# Patient Record
Sex: FEMALE | Race: ASIAN | NOT HISPANIC OR LATINO
[De-identification: names, ages, dates, MRNs, and addresses within clinical notes are randomized per-mention and may not be internally consistent; named-entity substitution may affect disease eponyms.]

---

## 2023-11-28 ENCOUNTER — APPOINTMENT (OUTPATIENT)
Dept: SURGICAL ONCOLOGY | Facility: CLINIC | Age: 73
End: 2023-11-28
Payer: MEDICARE

## 2023-11-28 VITALS
HEIGHT: 61.81 IN | WEIGHT: 135 LBS | DIASTOLIC BLOOD PRESSURE: 72 MMHG | SYSTOLIC BLOOD PRESSURE: 114 MMHG | HEART RATE: 80 BPM | TEMPERATURE: 97.7 F | RESPIRATION RATE: 17 BRPM | OXYGEN SATURATION: 98 % | BODY MASS INDEX: 24.84 KG/M2

## 2023-11-28 PROBLEM — Z00.00 ENCOUNTER FOR PREVENTIVE HEALTH EXAMINATION: Status: ACTIVE | Noted: 2023-11-28

## 2023-11-28 PROCEDURE — 99204 OFFICE O/P NEW MOD 45 MIN: CPT

## 2024-02-27 ENCOUNTER — APPOINTMENT (OUTPATIENT)
Dept: SURGICAL ONCOLOGY | Facility: CLINIC | Age: 74
End: 2024-02-27
Payer: MEDICARE

## 2024-02-27 VITALS
HEART RATE: 72 BPM | TEMPERATURE: 97.2 F | OXYGEN SATURATION: 98 % | DIASTOLIC BLOOD PRESSURE: 78 MMHG | HEIGHT: 61 IN | SYSTOLIC BLOOD PRESSURE: 121 MMHG | WEIGHT: 135 LBS | BODY MASS INDEX: 25.49 KG/M2 | RESPIRATION RATE: 17 BRPM

## 2024-02-27 PROCEDURE — 99213 OFFICE O/P EST LOW 20 MIN: CPT

## 2024-03-05 NOTE — HISTORY OF PRESENT ILLNESS
[de-identified] : Ms. Mosqueda is a 72 y/o female who presented with a bleeding distal duodenal GIST and severe anemia. She is s/p open resection at [facility] 11/18/2022. Pathology shows a 5.8 x 5.0 x 5.0 cm low grade GIST with negative margins. She had been on adjuvant Gleevec. Recent surveillance PET/CT scan 11/05/2023 demonstrated a NEW PET avid lower midline abdominal wall soft tissue nodule abutting the rectus muscle, measuring 1.5 cm with SUV max 3.8.  Findings were concerning for metastasis. She is referred for further evaluation. Patient feels well and is tolerating diet. She denies abdominal pain.  02/27/2024: Patient feels well and denies abdominal pain.  Since her initial consultation, she has undergone IR biopsy of PET avid abdominal wall mass 12/28/2023 but results were nondiagnostic (showed fibroadipose tissue).  She is referred for surgical excision.

## 2024-03-05 NOTE — REASON FOR VISIT
[Follow-Up Visit] : a follow-up visit for [Spouse] : spouse [Other: _____] : [unfilled] [FreeTextEntry2] : abdominal wall mass

## 2024-03-05 NOTE — PHYSICAL EXAM
[FreeTextEntry1] : Abdomen: soft, nontender/nondistended.  Well healed midline incision.  Soft tissue nodule not palpable.

## 2024-03-08 ENCOUNTER — OUTPATIENT (OUTPATIENT)
Dept: OUTPATIENT SERVICES | Facility: HOSPITAL | Age: 74
LOS: 1 days | End: 2024-03-08
Payer: MEDICARE

## 2024-03-08 VITALS
HEIGHT: 62 IN | TEMPERATURE: 100 F | OXYGEN SATURATION: 100 % | RESPIRATION RATE: 16 BRPM | SYSTOLIC BLOOD PRESSURE: 126 MMHG | WEIGHT: 134.04 LBS | DIASTOLIC BLOOD PRESSURE: 82 MMHG | HEART RATE: 64 BPM

## 2024-03-08 DIAGNOSIS — R19.00 INTRA-ABDOMINAL AND PELVIC SWELLING, MASS AND LUMP, UNSPECIFIED SITE: ICD-10-CM

## 2024-03-08 DIAGNOSIS — Z90.49 ACQUIRED ABSENCE OF OTHER SPECIFIED PARTS OF DIGESTIVE TRACT: Chronic | ICD-10-CM

## 2024-03-08 DIAGNOSIS — R22.2 LOCALIZED SWELLING, MASS AND LUMP, TRUNK: ICD-10-CM

## 2024-03-08 LAB
ALBUMIN SERPL ELPH-MCNC: 4.1 G/DL — SIGNIFICANT CHANGE UP (ref 3.3–5)
ALP SERPL-CCNC: 56 U/L — SIGNIFICANT CHANGE UP (ref 40–120)
ALT FLD-CCNC: 16 U/L — SIGNIFICANT CHANGE UP (ref 4–33)
ANION GAP SERPL CALC-SCNC: 10 MMOL/L — SIGNIFICANT CHANGE UP (ref 7–14)
AST SERPL-CCNC: 22 U/L — SIGNIFICANT CHANGE UP (ref 4–32)
BILIRUB SERPL-MCNC: 0.3 MG/DL — SIGNIFICANT CHANGE UP (ref 0.2–1.2)
BLD GP AB SCN SERPL QL: NEGATIVE — SIGNIFICANT CHANGE UP
BUN SERPL-MCNC: 13 MG/DL — SIGNIFICANT CHANGE UP (ref 7–23)
CALCIUM SERPL-MCNC: 8.7 MG/DL — SIGNIFICANT CHANGE UP (ref 8.4–10.5)
CHLORIDE SERPL-SCNC: 111 MMOL/L — HIGH (ref 98–107)
CO2 SERPL-SCNC: 23 MMOL/L — SIGNIFICANT CHANGE UP (ref 22–31)
CREAT SERPL-MCNC: 0.75 MG/DL — SIGNIFICANT CHANGE UP (ref 0.5–1.3)
EGFR: 85 ML/MIN/1.73M2 — SIGNIFICANT CHANGE UP
GLUCOSE SERPL-MCNC: 94 MG/DL — SIGNIFICANT CHANGE UP (ref 70–99)
HCT VFR BLD CALC: 32.9 % — LOW (ref 34.5–45)
HGB BLD-MCNC: 10.8 G/DL — LOW (ref 11.5–15.5)
MCHC RBC-ENTMCNC: 30.9 PG — SIGNIFICANT CHANGE UP (ref 27–34)
MCHC RBC-ENTMCNC: 32.8 GM/DL — SIGNIFICANT CHANGE UP (ref 32–36)
MCV RBC AUTO: 94.3 FL — SIGNIFICANT CHANGE UP (ref 80–100)
NRBC # BLD: 0 /100 WBCS — SIGNIFICANT CHANGE UP (ref 0–0)
NRBC # FLD: 0 K/UL — SIGNIFICANT CHANGE UP (ref 0–0)
PLATELET # BLD AUTO: 142 K/UL — LOW (ref 150–400)
POTASSIUM SERPL-MCNC: 3.6 MMOL/L — SIGNIFICANT CHANGE UP (ref 3.5–5.3)
POTASSIUM SERPL-SCNC: 3.6 MMOL/L — SIGNIFICANT CHANGE UP (ref 3.5–5.3)
PROT SERPL-MCNC: 6.8 G/DL — SIGNIFICANT CHANGE UP (ref 6–8.3)
RBC # BLD: 3.49 M/UL — LOW (ref 3.8–5.2)
RBC # FLD: 14.3 % — SIGNIFICANT CHANGE UP (ref 10.3–14.5)
RH IG SCN BLD-IMP: POSITIVE — SIGNIFICANT CHANGE UP
RH IG SCN BLD-IMP: POSITIVE — SIGNIFICANT CHANGE UP
SODIUM SERPL-SCNC: 144 MMOL/L — SIGNIFICANT CHANGE UP (ref 135–145)
WBC # BLD: 3.04 K/UL — LOW (ref 3.8–10.5)
WBC # FLD AUTO: 3.04 K/UL — LOW (ref 3.8–10.5)

## 2024-03-08 PROCEDURE — 93010 ELECTROCARDIOGRAM REPORT: CPT

## 2024-03-08 NOTE — H&P PST ADULT - MS GEN HX ROS MEA POS PC
"  Assessment & Plan     Benign paroxysmal positional vertigo, left  Secondary to ETD  Use OTC meclizine prn    Frontal sinus pain  Continue flonase  Course of medrol for inflammation and swelling of sinuses and to help with ETD  - methylPREDNISolone (MEDROL DOSEPAK) 4 MG tablet therapy pack; Follow package instructions      Dysfunction of left eustachian tube  Likely due to chronic allergies and sinus inflammation  Advised to continue flonase, add medrol and patient referred to ENT  - methylPREDNISolone (MEDROL DOSEPAK) 4 MG tablet therapy pack; Follow package instructions  - Otolaryngology Referral; Future    Chronic allergic conjunctivitis  Continue using allegra  Use patanol drops for allergic conjuctivitis  Follow up with PCP for refills if needed  - olopatadine (PATANOL) 0.1 % ophthalmic solution; Place 1 drop into both eyes 2 times daily    Decreased hearing of left ear  Likely due to ETD  Advised to schedule appt with ENT to discuss  Patient may be candidate for PE tubes do to ongoing ETD and hearing loss associated with is  - Otolaryngology Referral; Future    Chronic maxillary sinusitis  Referral to ENT for ongoing chronic sinusitis  If medrol, flonase treatments fail, patient would like to try antibiotics.  She has requested Ceftin as this is one of the only ones that has worked for her in the past.  She will not take this until trying all other measures first.   - Otolaryngology Referral; Future  - cefuroxime (CEFTIN) 500 MG tablet; Take 1 tablet (500 mg) by mouth 2 times daily for 10 days       BMI:   Estimated body mass index is 38.27 kg/m  as calculated from the following:    Height as of 2/9/22: 1.715 m (5' 7.5\").    Weight as of this encounter: 112.5 kg (248 lb).     CONSULTATION/REFERRAL to ENT for ETD, chronic sinusitis and hearing loss due to ETD    Return in about 2 weeks (around 3/21/2022).    Mark Reyes PA-C  [facility]    Miracle Barger is a 69 year old who " presents for the following health issues     HPI     was seen at [facility] 3/5/2022 for ear issues, and facial/sinus pain . Has not seen ENT recently, but has appt 5/23/2022, needs referral updated.  She is having hearing loss, vertigo flare ups and problems equalizing pressure in ears    Review of Systems   Constitutional, HEENT, cardiovascular, pulmonary, gi and gu systems are negative, except as otherwise noted.      Objective    BP (!) 140/85   Pulse 75   Temp 98.6  F (37  C)   Resp 20   Wt 112.5 kg (248 lb)   SpO2 95%   BMI 38.27 kg/m    Body mass index is 38.27 kg/m .  Physical Exam   GENERAL: healthy, alert and no distress  EYES: Eyes grossly normal to inspection, PERRL and conjunctivae and sclerae normal  HENT: ear canals and TM's normal, nose and mouth without ulcers or lesions  NECK: no adenopathy, no asymmetry, masses, or scars and thyroid normal to palpation  RESP: lungs clear to auscultation - no rales, rhonchi or wheezes  CV: regular rate and rhythm, normal S1 S2, no S3 or S4, no murmur, click or rub, no peripheral edema and peripheral pulses strong  MS: no gross musculoskeletal defects noted, no edema  SKIN: no suspicious lesions or rashes  NEURO: Positive for decreased hearing in left ear as compared to the right ear               knee/back pain/leg pain L/leg pain R

## 2024-03-08 NOTE — H&P PST ADULT - HISTORY OF PRESENT ILLNESS
74 y/o female who presented with a GI bleed, GIST and severe anemia, s/p 2 units blood transfusion, open resection at [facility] 11/18/2022.  Recent surveillance PET/CT scan 11/05/2023 demonstrated abdominal wall nodule, scheduled for resection of abdominal wall mass    Patient grandson assisted with translation, no [facility]  available, some difficulty translating

## 2024-03-08 NOTE — H&P PST ADULT - ATTENDING COMMENTS
Rescheduled abdominal wall mass excision to today.  Concern for solitary small bowel GIST metastasis.

## 2024-03-08 NOTE — H&P PST ADULT - NSICDXPASTMEDICALHX_GEN_ALL_CORE_FT
PAST MEDICAL HISTORY:  Abdominal mass     Gastrointestinal stromal tumor (GIST)     GI bleed     H/O nausea     History of blood transfusion     History of chemotherapy     Vertigo

## 2024-03-08 NOTE — H&P PST ADULT - PROBLEM SELECTOR PLAN 1
Patient scheduled for surgery on: 3/13/24  Provided with verbal and written presurgical instructions  Verbalized understanding  with teach back on the following: personal omeprazole for GI prophylaxis and chlorhexidine wash    Lab specimen drawn at [facility] today: cbc, cmp, type, abo

## 2024-03-15 PROBLEM — R42 DIZZINESS AND GIDDINESS: Chronic | Status: ACTIVE | Noted: 2024-03-08

## 2024-03-15 PROBLEM — Z92.21 PERSONAL HISTORY OF ANTINEOPLASTIC CHEMOTHERAPY: Chronic | Status: ACTIVE | Noted: 2024-03-08

## 2024-03-15 PROBLEM — K92.2 GASTROINTESTINAL HEMORRHAGE, UNSPECIFIED: Chronic | Status: ACTIVE | Noted: 2024-03-08

## 2024-03-15 PROBLEM — C49.A0 GASTROINTESTINAL STROMAL TUMOR, UNSPECIFIED SITE: Chronic | Status: ACTIVE | Noted: 2024-03-08

## 2024-03-15 PROBLEM — Z92.89 PERSONAL HISTORY OF OTHER MEDICAL TREATMENT: Chronic | Status: ACTIVE | Noted: 2024-03-08

## 2024-03-15 PROBLEM — R19.00 INTRA-ABDOMINAL AND PELVIC SWELLING, MASS AND LUMP, UNSPECIFIED SITE: Chronic | Status: ACTIVE | Noted: 2024-03-08

## 2024-03-15 PROBLEM — Z87.898 PERSONAL HISTORY OF OTHER SPECIFIED CONDITIONS: Chronic | Status: ACTIVE | Noted: 2024-03-08

## 2024-04-04 ENCOUNTER — TRANSCRIPTION ENCOUNTER (OUTPATIENT)
Age: 74
End: 2024-04-04

## 2024-04-05 ENCOUNTER — TRANSCRIPTION ENCOUNTER (OUTPATIENT)
Age: 74
End: 2024-04-05

## 2024-04-05 ENCOUNTER — APPOINTMENT (OUTPATIENT)
Dept: SURGICAL ONCOLOGY | Facility: HOSPITAL | Age: 74
End: 2024-04-05

## 2024-04-05 ENCOUNTER — OUTPATIENT (OUTPATIENT)
Dept: OUTPATIENT SERVICES | Facility: HOSPITAL | Age: 74
LOS: 1 days | Discharge: HOME | End: 2024-04-05
Payer: MEDICARE

## 2024-04-05 ENCOUNTER — RESULT REVIEW (OUTPATIENT)
Age: 74
End: 2024-04-05

## 2024-04-05 VITALS
HEIGHT: 62 IN | HEART RATE: 63 BPM | TEMPERATURE: 98 F | WEIGHT: 134.04 LBS | SYSTOLIC BLOOD PRESSURE: 132 MMHG | RESPIRATION RATE: 16 BRPM | DIASTOLIC BLOOD PRESSURE: 78 MMHG | OXYGEN SATURATION: 99 %

## 2024-04-05 VITALS
TEMPERATURE: 98 F | RESPIRATION RATE: 16 BRPM | HEART RATE: 79 BPM | DIASTOLIC BLOOD PRESSURE: 83 MMHG | SYSTOLIC BLOOD PRESSURE: 128 MMHG | OXYGEN SATURATION: 98 %

## 2024-04-05 DIAGNOSIS — Z90.49 ACQUIRED ABSENCE OF OTHER SPECIFIED PARTS OF DIGESTIVE TRACT: Chronic | ICD-10-CM

## 2024-04-05 DIAGNOSIS — R22.2 LOCALIZED SWELLING, MASS AND LUMP, TRUNK: ICD-10-CM

## 2024-04-05 PROCEDURE — 88341 IMHCHEM/IMCYTCHM EA ADD ANTB: CPT | Mod: 26

## 2024-04-05 PROCEDURE — 88342 IMHCHEM/IMCYTCHM 1ST ANTB: CPT | Mod: 26

## 2024-04-05 PROCEDURE — 88305 TISSUE EXAM BY PATHOLOGIST: CPT | Mod: 26

## 2024-04-05 PROCEDURE — 88381 MICRODISSECTION MANUAL: CPT | Mod: 26

## 2024-04-05 PROCEDURE — 22905 RAD RESECT ABD TUMOR 5 CM/>: CPT

## 2024-04-05 PROCEDURE — 49593 RPR AA HRN 1ST 3-10 RDC: CPT | Mod: GC

## 2024-04-05 DEVICE — MESH PHASIX ST 7X10CM: Type: IMPLANTABLE DEVICE | Status: FUNCTIONAL

## 2024-04-05 DEVICE — ARISTA 3GR: Type: IMPLANTABLE DEVICE | Status: FUNCTIONAL

## 2024-04-05 RX ORDER — IMATINIB MESYLATE 400 MG/1
1 TABLET, FILM COATED ORAL
Refills: 0 | DISCHARGE

## 2024-04-05 RX ORDER — METOCLOPRAMIDE HCL 10 MG
1 TABLET ORAL
Refills: 0 | DISCHARGE

## 2024-04-05 RX ORDER — MAGNESIUM OXIDE 400 MG ORAL TABLET 241.3 MG
1 TABLET ORAL
Refills: 0 | DISCHARGE

## 2024-04-05 RX ORDER — MECLIZINE HCL 12.5 MG
1 TABLET ORAL
Refills: 0 | DISCHARGE

## 2024-04-05 RX ORDER — OMEPRAZOLE 10 MG/1
1 CAPSULE, DELAYED RELEASE ORAL
Refills: 0 | DISCHARGE

## 2024-04-05 RX ORDER — ASPIRIN/CALCIUM CARB/MAGNESIUM 324 MG
1 TABLET ORAL
Refills: 0 | DISCHARGE

## 2024-04-05 RX ORDER — OXYCODONE HYDROCHLORIDE 5 MG/1
1 TABLET ORAL
Qty: 5 | Refills: 0
Start: 2024-04-05

## 2024-04-05 NOTE — ASU PREOP CHECKLIST - 2.
Patient unable to remove bilateral gold norma stud earrings-jewelry waiver completed on paper chart.

## 2024-04-05 NOTE — ASU DISCHARGE PLAN (ADULT/PEDIATRIC) - CARE PROVIDER_API CALL
[facility]  [address] [place] [id number]  Phone: [phone]  Fax: [phone]  Follow Up Time: 2 weeks

## 2024-04-05 NOTE — PACU DISCHARGE NOTE - COMMENTS
Bed: 10  Expected date:   Expected time:   Means of arrival:   Comments:  PEC from clinic   No anesthetic complications

## 2024-04-05 NOTE — ASU DISCHARGE PLAN (ADULT/PEDIATRIC) - ASU DC SPECIAL INSTRUCTIONSFT
WOUND CARE: Remove the top layer of dressings 4 days after your surgery. Underneath are white strips called steri strips. Leave the steri strips in place and allow them to fall off on their own.  BATHING: Please do not submerge wound underwater. You may shower and/or sponge bathe.  ACTIVITY: No heavy lifting or straining for 6 weeks. Otherwise, you may return to your usual level of physical activity. If you are taking narcotic pain medication (such as Percocet), do NOT drive a car, operate machinery or make important decisions.  DIET: Return to your usual diet.  NOTIFY YOUR SURGEON IF: You have any bleeding that does not stop, any pus draining from your wound, any fever (over 100.4 F) or chills, persistent nausea/vomiting, persistent diarrhea, or if your pain is not controlled on your discharge pain medications.  FOLLOW-UP:  1. Please call to make a follow-up appointment within one week of discharge

## 2024-04-05 NOTE — ASU PREOP CHECKLIST - 1.
Patient primarily [facility] speaking- Tae utilized ID [id number]

## 2024-04-05 NOTE — BRIEF OPERATIVE NOTE - NSICDXBRIEFPROCEDURE_GEN_ALL_CORE_FT
PROCEDURES:  Excision, mass, umbilical 05-Apr-2024 14:02:49 Excision of abd wall mass below the umbilicus Adriana Lo

## 2024-04-05 NOTE — ASU DISCHARGE PLAN (ADULT/PEDIATRIC) - NS MD DC FALL RISK RISK
For information on Fall & Injury Prevention, visit: [email or web address] OR  [email or web address] OR  https://www.cdc.gov/steadi/patient.html

## 2024-04-12 LAB — SURGICAL PATHOLOGY STUDY: SIGNIFICANT CHANGE UP

## 2024-04-16 ENCOUNTER — APPOINTMENT (OUTPATIENT)
Dept: SURGICAL ONCOLOGY | Facility: CLINIC | Age: 74
End: 2024-04-16
Payer: MEDICARE

## 2024-04-16 VITALS
HEART RATE: 68 BPM | SYSTOLIC BLOOD PRESSURE: 123 MMHG | HEIGHT: 62 IN | BODY MASS INDEX: 23.74 KG/M2 | OXYGEN SATURATION: 98 % | DIASTOLIC BLOOD PRESSURE: 79 MMHG | WEIGHT: 129 LBS

## 2024-04-16 PROCEDURE — 99024 POSTOP FOLLOW-UP VISIT: CPT

## 2024-04-16 NOTE — HISTORY OF PRESENT ILLNESS
[de-identified] : Ms. Mosqueda is a 74 y/o female who presented with a bleeding distal duodenal GIST and severe anemia. She is s/p open resection at [facility] 11/18/2022. Pathology shows a 5.8 x 5.0 x 5.0 cm low grade GIST with negative margins. She had been on adjuvant Gleevec. Recent surveillance PET/CT scan 11/05/2023 demonstrated a NEW PET avid lower midline abdominal wall soft tissue nodule abutting the rectus muscle, measuring 1.5 cm with SUV max 3.8.  Findings were concerning for metastasis. She is referred for further evaluation. Patient feels well and is tolerating diet. She denies abdominal pain.  02/27/2024: Patient feels well and denies abdominal pain.  Since her initial consultation, she has undergone IR biopsy of PET avid abdominal wall mass 12/28/2023 but results were nondiagnostic (showed fibroadipose tissue).  She is referred for surgical excision.  04/16/2024: Patient is s/p excision of abdominal wall mass with mesh reconstruction 04/05/2024.  She is recovering well and reports decreasing abdominal pain.  She is eating well without nausea/emesis. Pathology: 1.0x1.0x0.5 cm area of ill-defined yellow area showing myofibroblastic proliferation.  No evidence of GIST.

## 2024-05-14 ENCOUNTER — APPOINTMENT (OUTPATIENT)
Dept: SURGICAL ONCOLOGY | Facility: CLINIC | Age: 74
End: 2024-05-14
Payer: MEDICARE

## 2024-05-14 VITALS
OXYGEN SATURATION: 98 % | SYSTOLIC BLOOD PRESSURE: 107 MMHG | RESPIRATION RATE: 18 BRPM | TEMPERATURE: 97.4 F | HEART RATE: 79 BPM | DIASTOLIC BLOOD PRESSURE: 74 MMHG | WEIGHT: 134 LBS | BODY MASS INDEX: 24.51 KG/M2

## 2024-05-14 DIAGNOSIS — C49.A3 GASTROINTESTINAL STROMAL TUMOR OF SMALL INTESTINE: ICD-10-CM

## 2024-05-14 DIAGNOSIS — R22.2 LOCALIZED SWELLING, MASS AND LUMP, TRUNK: ICD-10-CM

## 2024-05-14 PROCEDURE — 99024 POSTOP FOLLOW-UP VISIT: CPT

## 2024-05-15 PROBLEM — R22.2 ABDOMINAL WALL MASS: Status: ACTIVE | Noted: 2023-12-02

## 2024-05-15 PROBLEM — C49.A3 GASTROINTESTINAL STROMAL TUMOR (GIST) OF DUODENUM: Status: ACTIVE | Noted: 2023-12-02

## 2024-05-15 NOTE — HISTORY OF PRESENT ILLNESS
[de-identified] : Ms. Mosqueda is a 74 y/o female who presented with a bleeding distal duodenal GIST and severe anemia. She is s/p open resection at [facility] 11/18/2022. Pathology shows a 5.8 x 5.0 x 5.0 cm low grade GIST with negative margins. She had been on adjuvant Gleevec. Recent surveillance PET/CT scan 11/05/2023 demonstrated a NEW PET avid lower midline abdominal wall soft tissue nodule abutting the rectus muscle, measuring 1.5 cm with SUV max 3.8.  Findings were concerning for metastasis. She is referred for further evaluation. Patient feels well and is tolerating diet. She denies abdominal pain.  02/27/2024: Patient feels well and denies abdominal pain.  Since her initial consultation, she has undergone IR biopsy of PET avid abdominal wall mass 12/28/2023 but results were nondiagnostic (showed fibroadipose tissue).  She is referred for surgical excision.  04/16/2024: Patient is s/p excision of abdominal wall mass with mesh reconstruction 04/05/2024.  She is recovering well and reports decreasing abdominal pain.  She is eating well without nausea/emesis. Pathology: 1.0x1.0x0.5 cm area of ill-defined yellow area showing myofibroblastic proliferation.  No evidence of GIST.  05/14/2024: Patient feels well and has decreased/minimal abdominal pain.  Sheis tolerating diet without nausea/emesis.

## 2024-10-15 NOTE — ASU PREOP CHECKLIST - AICD PRESENT
no Detail Level: Zone Photo Preface (Leave Blank If You Do Not Want): Photographs were obtained today

## 2025-06-09 ENCOUNTER — APPOINTMENT (OUTPATIENT)
Dept: SURGICAL ONCOLOGY | Facility: CLINIC | Age: 75
End: 2025-06-09
Payer: MEDICARE

## 2025-06-09 VITALS
RESPIRATION RATE: 16 BRPM | SYSTOLIC BLOOD PRESSURE: 125 MMHG | WEIGHT: 132 LBS | HEART RATE: 79 BPM | OXYGEN SATURATION: 96 % | BODY MASS INDEX: 24.14 KG/M2 | DIASTOLIC BLOOD PRESSURE: 75 MMHG

## 2025-06-09 PROCEDURE — 99213 OFFICE O/P EST LOW 20 MIN: CPT

## (undated) DEVICE — DRAIN RESERVOIR FOR JACKSON PRATT 100CC CARDINAL

## (undated) DEVICE — FOLEY TRAY 16FR 5CC LF UMETER CLOSED

## (undated) DEVICE — SOL IRR POUR H2O 1500ML

## (undated) DEVICE — DRAPE TOWEL BLUE 17" X 24"

## (undated) DEVICE — SUT PDS II 4-0 27" RB-1

## (undated) DEVICE — URETERAL CATH RED RUBBER 10FR (BLACK)

## (undated) DEVICE — LABELS BLANK W PEN

## (undated) DEVICE — DRSG TAPE UMBILICAL COTTON 2" X 30 X 1/8"

## (undated) DEVICE — VENODYNE/SCD SLEEVE CALF MEDIUM

## (undated) DEVICE — DRAIN PENROSE 1" X 18" LATEX

## (undated) DEVICE — PREP BETADINE KIT

## (undated) DEVICE — DRAPE LAPAROTOMY TRANSVERSE

## (undated) DEVICE — SUT SILK 3-0 30" KS

## (undated) DEVICE — SUT SILK 0 18" TIES

## (undated) DEVICE — STAPLER SKIN VISI-STAT 35 WIDE

## (undated) DEVICE — FEEDING TUBE NG ARGYLE PVC 5FR 41CM ENFIT

## (undated) DEVICE — SUT SILK 2-0 18" SH (POP-OFF)

## (undated) DEVICE — GLV 8 PROTEXIS (CREAM) MICRO

## (undated) DEVICE — DRAIN JACKSON PRATT 10MM FLAT FULL NO TROCAR

## (undated) DEVICE — SUT SILK 3-0 18" TIES

## (undated) DEVICE — WARMING BLANKET FULL UNDERBODY

## (undated) DEVICE — SUT MONOCRYL 4-0 27" PS-2 UNDYED

## (undated) DEVICE — ELCTR GROUNDING PAD ADULT COVIDIEN

## (undated) DEVICE — VESSEL LOOP MAXI-YELLOW  0.120" X 16"

## (undated) DEVICE — SOL IRR POUR NS 0.9% 1500ML

## (undated) DEVICE — ELCTR BOVIE TIP BLADE INSULATED 2.75" EDGE

## (undated) DEVICE — DRAIN PENROSE .5" X 18" LATEX

## (undated) DEVICE — POSITIONER STRAP ARMBOARD VELCRO TS-30

## (undated) DEVICE — WARMING BLANKET LOWER ADULT

## (undated) DEVICE — GLV 7.5 PROTEXIS (WHITE)

## (undated) DEVICE — LIGASURE IMPACT

## (undated) DEVICE — SUT VICRYL 3-0 27" SH UNDYED

## (undated) DEVICE — ELCTR BOVIE PENCIL SMOKE EVACUATION

## (undated) DEVICE — FEEDING TUBE NG ARGYLE PVC 8FR 41CM ENFIT

## (undated) DEVICE — SUT PDS II 5-0 27" RB-1

## (undated) DEVICE — TROCAR COVIDIEN VERSAONE BLADED FIXATION 12MM STANDARD

## (undated) DEVICE — LIGASURE MARYLAND 23MM

## (undated) DEVICE — TROCAR COVIDIEN VERSAPORT BLADELESS OPTICAL 5MM STANDARD

## (undated) DEVICE — SUT VICRYL 2-0 18" TIES UNDYED

## (undated) DEVICE — SUT VICRYL 0 18" TIES UNDYED

## (undated) DEVICE — PACK MAJOR ABDOMINAL WITH LAP